# Patient Record
(demographics unavailable — no encounter records)

---

## 2024-11-18 NOTE — ASSESSMENT
[FreeTextEntry1] : ======================================================================================= 1. 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, p/[id number]):   - TTR stabilizer: continue Vyndamx (tafamadis) 61mg po qd   - gene silencer therapy: continue Amvuttra (vutrisiran, siRNA) 25mg sc q 3 months, instructed to take a MVI with sufficient vitamin A   - TTR depleter therapy: not clinically available yet    - MRA: continue spironolactone   - SGLT2i: not indicated at this time    - Loop diuretics: none required    - avoid heart rate slowing agents given poor tolerance in restrictive cardiomyopathy   - avoid ACE / ARB / ARNi given poor tolerance in cardiac amyloidosis   - will follow with serial serum biomarkers (NT-proBNP, creatinine, prealbumin)   - will follow with serial echocardiograms with assessment of LV global longitudinal strain (ordered today)   Monitoring Disease Progression: ---------------------------------------------------- Date.............Prealbumin.................Creatinine..........NT-proBNP..........LV GLS TTE.... 11/18/24......pending.................................... 08/22/24........11.................................1.22.................105....................-18.0% 12/12/23.........5..................................1.05.................184.............................................. 09/11/23................................................................................................-20.4% 09/11/23......dose 2 Amvuttra 06/26/23...... initiated Amvuttra......................................................................................... 04/17/23.........29..........................................................94................................................... 10/25/22.........18........................................................158................................................... 10/22/22........initiated Vyndamax......................................................................................  2. 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 Hypertension: BP not at ACC/AHA 2017 guideline target: off medications again today:  - will avoid heart rate slowing agents given poor tolerance in people with amyloid cardiomyopathy  - change valsartan 320mg po qd to valsartan//25mg po qd   - if BP remains above target next visit will up titrate anti-HTN regimen  4. Dyslipidemia:  (04/17/23):  - continue atorvastatin 20mg po qd  - discussed therapeutic lifestyle changes to promote improved lipid metabolism  - check lab work today  5. Obesity: BMI 34.8: + weight gain (14 pounds since last visit)   - we had an extensive discussion about therapeutic lifestyle changes to promote increased cardiovascular fitness and achieving goal weight  6. Rule out obstructive sleep apnea:  - will order a home sleep study     I spent > 60 minutes of total time on this visit, including time spent face-to-face and non-face-to-face.  During this time, I took a relevant history and examined the patient.  I reviewed relevant portions of the medical record and formulated a differential diagnosis and plan.  I explained the relevant cardiac diagnoses to the patient, as well as the work up and management plan.  I answered all questions related to the patient's cardiac conditions.

## 2024-11-18 NOTE — REASON FOR VISIT
[FreeTextEntry1] : ======================================================================================= Diagnostic Tests: -------------------------------------------------------------- EC24: sinus bradycardia at 59 bpm, otherwise normal ECG.  23: sinus rhythm, normal ECG.  23: sinus rhythm, low voltage QRS limb leads. 10/24/22: sinus rhythm, low voltage QRS limb leads. -------------------------------------------------------------- Nuclear and stress tests: 22: Tc-99m PYP scan: + ATTR, grade II.  -------------------------------------------------------------- Echo: 24: EF 67%, mildly increased LV wall thickness, LV GLS -18.0%, aortic sclerosis, mild AI.  23: EF 63%, mildly increased LV wall thickness, LV GLS -20.4%, mildly increased RV wall thickness, aortic sclerosis, mild MR.  22: EF 60%, mildly increased LV wall thickness, mildly dilated LA, mild MR, mild AI,

## 2024-11-18 NOTE — ADDENDUM
[FreeTextEntry1] : 10/23/23: Her pharmacy benefit manager, Elizabeth, has denied her Vyndamax stating there is no indication for the medication and that use of Vyndamax with Amvuttra is not approvable.  Today, 10/23/23, I spoke with one of their physicians, Aria Luque MD, and clearly explained that Vyndamax is currently the only medication approved in the [place] to treat cardiac amyloidosis and the AMVUTTRA is one of the few indicated to treated amyloid polyneuropathy.  I explained that the indications are distinct, and each is clearly FDA approved therapy for their respective conditions.  To deny Vyndamax would directly negatively impact Ms. Mcgovern's cardiac health and result in increased mortality.  I would refer you to the ATTR-ACT Trial published in the [place] Journal of Medicine (Tafamidis Treatment for Patients with Transthyretin Amyloid Cardiomyopathy, N Engl J Med 2018; 379:5249-1475).  If her AMVUTTRA continues to be denies we would recommend proceeding with Vyndamax alone.

## 2024-11-18 NOTE — HISTORY OF PRESENT ILLNESS
[FreeTextEntry1] : Ms. Mcgovern presents for follow up and management of cardiac amyloidosis (ATTRvt-CM), dyslipidemia, HTN, and pre-DM2.  She is followed by her primary cardiologist, Marie Mir MD.  Her son was the proband case of the ATTRvt-CM and she tested positive during cascade screening of his family members.  She was started on TTR stabilizing therapy with Vyndamax on 10/24/22. Unfortunately, her son was diagnosed at age 39 and already has advanced neurologic manifestations of his disease. She has had 5 members of her family pass away from amyloidosis.  Her amyloid polyneuropathy symptoms have greatly improved on Amvuttra.  On 08/22/24, she had an echocardiogram which revealed an EF of 67%, mildly increased LV wall thickness, LV GLS -18.0%, aortic sclerosis, and mild AI.  She screened positive for possible obstructive sleep apnea, and we agreed to pursue a home sleep study.

## 2024-11-18 NOTE — ADDENDUM
[FreeTextEntry1] : 10/23/23: Her pharmacy benefit manager, Elizabeth, has denied her Vyndamax stating there is no indication for the medication and that use of Vyndamax with Amvuttra is not approvable.  Today, 10/23/23, I spoke with one of their physicians, Aria Luque MD, and clearly explained that Vyndamax is currently the only medication approved in the [place] to treat cardiac amyloidosis and the AMVUTTRA is one of the few indicated to treated amyloid polyneuropathy.  I explained that the indications are distinct, and each is clearly FDA approved therapy for their respective conditions.  To deny Vyndamax would directly negatively impact Ms. Mcgovern's cardiac health and result in increased mortality.  I would refer you to the ATTR-ACT Trial published in the [place] Journal of Medicine (Tafamidis Treatment for Patients with Transthyretin Amyloid Cardiomyopathy, N Engl J Med 2018; 379:6542-8886).  If her AMVUTTRA continues to be denies we would recommend proceeding with Vyndamax alone.

## 2024-11-18 NOTE — ASSESSMENT
[FreeTextEntry1] : ======================================================================================= 1. 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, p/[id number]):   - TTR stabilizer: continue Vyndamx (tafamadis) 61mg po qd   - gene silencer therapy: continue Amvuttra (vutrisiran, siRNA) 25mg sc q 3 months, instructed to take a MVI with sufficient vitamin A   - TTR depleter therapy: not clinically available yet    - MRA: continue spironolactone   - SGLT2i: not indicated at this time    - Loop diuretics: none required    - avoid heart rate slowing agents given poor tolerance in restrictive cardiomyopathy   - avoid ACE / ARB / ARNi given poor tolerance in cardiac amyloidosis   - will follow with serial serum biomarkers (NT-proBNP, creatinine, prealbumin)   - will follow with serial echocardiograms with assessment of LV global longitudinal strain (ordered today)   Monitoring Disease Progression: ---------------------------------------------------- Date.............Prealbumin.................Creatinine..........NT-proBNP..........LV GLS TTE.... 11/18/24......pending.................................... 08/22/24........11.................................1.22.................105....................-18.0% 12/12/23.........5..................................1.05.................184.............................................. 09/11/23................................................................................................-20.4% 09/11/23......dose 2 Amvuttra 06/26/23...... initiated Amvuttra......................................................................................... 04/17/23.........29..........................................................94................................................... 10/25/22.........18........................................................158................................................... 10/22/22........initiated Vyndamax......................................................................................  2. 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 Hypertension: BP not at ACC/AHA 2017 guideline target: off medications again today:  - will avoid heart rate slowing agents given poor tolerance in people with amyloid cardiomyopathy  - change valsartan 320mg po qd to valsartan//25mg po qd   - if BP remains above target next visit will up titrate anti-HTN regimen  4. Dyslipidemia:  (04/17/23):  - continue atorvastatin 20mg po qd  - discussed therapeutic lifestyle changes to promote improved lipid metabolism  - check lab work today  5. Obesity: BMI 34.8: + weight gain (14 pounds since last visit)   - we had an extensive discussion about therapeutic lifestyle changes to promote increased cardiovascular fitness and achieving goal weight  6. Rule out obstructive sleep apnea:  - will order a home sleep study     I spent > 60 minutes of total time on this visit, including time spent face-to-face and non-face-to-face.  During this time, I took a relevant history and examined the patient.  I reviewed relevant portions of the medical record and formulated a differential diagnosis and plan.  I explained the relevant cardiac diagnoses to the patient, as well as the work up and management plan.  I answered all questions related to the patient's cardiac conditions.

## 2024-11-19 NOTE — REASON FOR VISIT
[Other: ____] : [unfilled] [FreeTextEntry1] : ======================================================================================= Diagnostic Tests: -------------------------------------------------------------- EC24: sinus bradycardia at 59 bpm, otherwise normal ECG.  23: sinus rhythm, normal ECG.  23: sinus rhythm, low voltage QRS limb leads. 10/24/22: sinus rhythm, low voltage QRS limb leads. -------------------------------------------------------------- Nuclear and stress tests: 22: Tc-99m PYP scan: + ATTR, grade II.  -------------------------------------------------------------- Echo: 24: EF 67%, mildly increased LV wall thickness, LV GLS -18.0%, aortic sclerosis, mild AI.  23: EF 63%, mildly increased LV wall thickness, LV GLS -20.4%, mildly increased RV wall thickness, aortic sclerosis, mild MR.  22: EF 60%, mildly increased LV wall thickness, mildly dilated LA, mild MR, mild AI,

## 2024-11-19 NOTE — ASSESSMENT
[FreeTextEntry1] : ======================================================================================= 1. 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, p/[id number]):   - TTR stabilizer: continue Vyndamx (tafamadis) 61mg po qd   - gene silencer therapy: continue Amvuttra (vutrisiran, siRNA) 25mg sc q 3 months, instructed to take a MVI with sufficient vitamin A   - TTR depleter therapy: not clinically available yet    - MRA: continue spironolactone   - SGLT2i: not indicated at this time    - Loop diuretics: none required    - avoid heart rate slowing agents given poor tolerance in restrictive cardiomyopathy   - avoid ACE / ARB / ARNi given poor tolerance in cardiac amyloidosis   - will follow with serial serum biomarkers (NT-proBNP, creatinine, prealbumin)   - will follow with serial echocardiograms with assessment of LV global longitudinal strain (ordered today)   Monitoring Disease Progression: ---------------------------------------------------- Date.............Prealbumin.................Creatinine..........NT-proBNP..........LV GLS TTE.... 11/18/24.........5..................................1.32.................112 08/22/24........11.................................1.22.................105....................-18.0% 12/12/23.........5..................................1.05.................184.............................................. 09/11/23................................................................................................-20.4% 09/11/23......dose 2 Amvuttra 06/26/23...... initiated Amvuttra......................................................................................... 04/17/23.........29..........................................................94................................................... 10/25/22.........18........................................................158................................................... 10/22/22........initiated Vyndamax......................................................................................  2. 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 Hypertension: BP not at ACC/AHA 2017 guideline target: off medications again today:  - will avoid heart rate slowing agents given poor tolerance in people with amyloid cardiomyopathy  - change valsartan 320mg po qd to valsartan//25mg po qd   - if BP remains above target next visit will up titrate anti-HTN regimen  4. Dyslipidemia:  (04/17/23):  - continue atorvastatin 20mg po qd  - discussed therapeutic lifestyle changes to promote improved lipid metabolism  - check lab work today  5. Obesity: BMI 34.8: + weight gain (14 pounds since last visit)   - we had an extensive discussion about therapeutic lifestyle changes to promote increased cardiovascular fitness and achieving goal weight  6. Rule out obstructive sleep apnea:  - will order a home sleep study     I spent > 60 minutes of total time on this visit, including time spent face-to-face and non-face-to-face.  During this time, I took a relevant history and examined the patient.  I reviewed relevant portions of the medical record and formulated a differential diagnosis and plan.  I explained the relevant cardiac diagnoses to the patient, as well as the work up and management plan.  I answered all questions related to the patient's cardiac conditions.

## 2024-11-19 NOTE — ADDENDUM
[FreeTextEntry1] : 10/23/23: Her pharmacy benefit manager, Elizabeth, has denied her Vyndamax stating there is no indication for the medication and that use of Vyndamax with Amvuttra is not approvable.  Today, 10/23/23, I spoke with one of their physicians, Aria Luque MD, and clearly explained that Vyndamax is currently the only medication approved in the [place] to treat cardiac amyloidosis and the AMVUTTRA is one of the few indicated to treated amyloid polyneuropathy.  I explained that the indications are distinct, and each is clearly FDA approved therapy for their respective conditions.  To deny Vyndamax would directly negatively impact Ms. Mcgovern's cardiac health and result in increased mortality.  I would refer you to the ATTR-ACT Trial published in the [place] Journal of Medicine (Tafamidis Treatment for Patients with Transthyretin Amyloid Cardiomyopathy, N Engl J Med 2018; 379:8711-5215).  If her AMVUTTRA continues to be denies we would recommend proceeding with Vyndamax alone.

## 2025-02-22 NOTE — HISTORY OF PRESENT ILLNESS
[FreeTextEntry1] : The patient is a 67 year old F with PMHx of cardiac amyloidosis, HLD, HTN, pre-DM, referred by Dr. Mitchell as she screened positive for possible MATT. HST shows mild to moderate MATT. She endorses snoring and choking episodes at night, difficult to sleep on her back without pillows. Endorses frequent nightly awakenings, unrefreshing sleep. Has migraines.  [ESS] : 6

## 2025-02-22 NOTE — ASSESSMENT
[FreeTextEntry1] : Reviewed: HST 1/11/2025: AHI 7.2 (4%); AHI 21.3 (3%); t88 0.2 minutes  The patient is a 67 year old F with PMHx of cardiac amyloidosis, HLD, HTN, pre-DM, referred by Dr. Mitchell as she screened positive for possible MATT. HST shows mild to moderate MATT. The benefits of MATT treatment in improving cardiac, neurologic, cognitive, and mortality outcomes were discussed. PAP, MAD, as well as Zepbound were discussed. Discussed that weight loss can in some instances improve MATT severity. She would like to focus on diet and exercise for weight loss and will reach out to repeat an HST thereafter to assess for possible improvements in MATT severity. Advised to reach out should she wish to pursue pap therapy.   Follow-up as needed.

## 2025-02-22 NOTE — CONSULT LETTER
[Dear  ___] : Dear  [unfilled], [Consult Letter:] : I had the pleasure of evaluating your patient, [unfilled]. [Please see my note below.] : Please see my note below. [Consult Closing:] : Thank you very much for allowing me to participate in the care of this patient.  If you have any questions, please do not hesitate to contact me. [Sincerely,] : Sincerely, [FreeTextEntry3] : Merlene Gonzalez MD  [employer] at [facility] Pulmonary, Critical Care, and Sleep Medicine

## 2025-02-22 NOTE — CONSULT LETTER
[Dear  ___] : Dear  [unfilled], [Consult Letter:] : I had the pleasure of evaluating your patient, [unfilled]. [Please see my note below.] : Please see my note below. [Consult Closing:] : Thank you very much for allowing me to participate in the care of this patient.  If you have any questions, please do not hesitate to contact me. [Sincerely,] : Sincerely, [FreeTextEntry3] : Merlene Gonzalez MD  [employer] at [facility] Pulmonary, Critical Care, and Sleep Medicine

## 2025-02-22 NOTE — PHYSICAL EXAM
[General Appearance - Well Developed] : well developed [General Appearance - Well Nourished] : well nourished [Neck Appearance] : the appearance of the neck was normal [] : no respiratory distress [Exaggerated Use Of Accessory Muscles For Inspiration] : no accessory muscle use [Oriented To Time, Place, And Person] : oriented to person, place, and time [Impaired Insight] : insight and judgment were intact [Abnormal Walk] : normal gait

## 2025-02-22 NOTE — REVIEW OF SYSTEMS
[Snoring] : snoring [Obesity] : obesity [Difficulty Maintaining Sleep] : difficulty maintaining sleep [Negative] : Musculoskeletal [Lower Extremity Discomfort] : no lower extremity discomfort [Unusual Sleep Behavior] : no unusual sleep behavior

## 2025-02-22 NOTE — END OF VISIT
[FreeTextEntry3] :   I, Merlene Gonzalez MD, personally performed the evaluation and management (E/M) services for this patient. That E/M includes conducting the clinically appropriate initial history &/or exam, assessing all conditions, and establishing the plan of care. I have also independently reviewed the medical records and imaging at the time of this office visit, and discussed the above mentioned images with interpretations with the patient. Today, VERONICA Brennan was here to participate in the visit & follow plan of care established by me.

## 2025-03-17 NOTE — REASON FOR VISIT
[FreeTextEntry1] : ======================================================================================= Diagnostic Tests: -------------------------------------------------------------- EC25: sinus rhythm, normal ECG.  24: sinus bradycardia at 59 bpm, otherwise normal ECG.  23: sinus rhythm, normal ECG.  23: sinus rhythm, low voltage QRS limb leads. 10/24/22: sinus rhythm, low voltage QRS limb leads. -------------------------------------------------------------- Nuclear and stress tests: 22: Tc-99m PYP scan: + ATTR, grade II.  -------------------------------------------------------------- Echo: 25: EF 61%, mildly increased LV wall thickness, LV GLS -17.9%, aortic sclerosis, mild AI.  24: EF 67%, mildly increased LV wall thickness, LV GLS -18.0%, aortic sclerosis, mild AI.  23: EF 63%, mildly increased LV wall thickness, LV GLS -20.4%, mildly increased RV wall thickness, aortic sclerosis, mild MR.  22: EF 60%, mildly increased LV wall thickness, mildly dilated LA, mild MR, mild AI. -------------------------------------------------------------- Sleep studies: 25: HST: mild MATT.

## 2025-03-17 NOTE — ADDENDUM
[FreeTextEntry1] : 10/23/23: Her pharmacy benefit manager, Elizabeth, has denied her Vyndamax stating there is no indication for the medication and that use of Vyndamax with Amvuttra is not approvable.  Today, 10/23/23, I spoke with one of their physicians, Aria Luque MD, and clearly explained that Vyndamax is currently the only medication approved in the [place] to treat cardiac amyloidosis and the AMVUTTRA is one of the few indicated to treated amyloid polyneuropathy.  I explained that the indications are distinct, and each is clearly FDA approved therapy for their respective conditions.  To deny Vyndamax would directly negatively impact Ms. Mcgovern's cardiac health and result in increased mortality.  I would refer you to the ATTR-ACT Trial published in the [place] Journal of Medicine (Tafamidis Treatment for Patients with Transthyretin Amyloid Cardiomyopathy, N Engl J Med 2018; 379:0802-6141).  If her AMVUTTRA continues to be denies we would recommend proceeding with Vyndamax alone.

## 2025-03-17 NOTE — ASSESSMENT
[FreeTextEntry1] : ======================================================================================= 1. 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, p/[id number]):   - TTR stabilizer: continue Vyndamx (tafamadis) 61mg po qd   - gene silencer therapy: she will initiate Wainua (eplontersen, ASO) 45mg sc q month, instructed to take a MVI with sufficient vitamin A (Amvuttra not covered by her insurance)    - TTR depleter therapy: not clinically available yet    - MRA: consider adding spironolactone in the future   - SGLT2i: not indicated at this time    - Loop diuretics: none required    - avoid heart rate slowing agents given poor tolerance in restrictive cardiomyopathy   - avoid ACE / ARB / ARNi given poor tolerance in cardiac amyloidosis   - will follow with serial serum biomarkers (NT-proBNP, creatinine, prealbumin) (ordered today)    - will follow with serial echocardiograms with assessment of LV global longitudinal strain  Monitoring Disease Progression: ---------------------------------------------------- Date.............Prealbumin.................Creatinine..........NT-proBNP..........LV GLS TTE.... 03/18/25.....switched Amvuttra to Wainua..................................................................... 03/17/25...............................................................................................-17.9% 11/18/24.........5..................................1.32.................112 08/22/24........11.................................1.22.................105....................-18.0% 12/12/23.........5..................................1.05.................184.............................................. 09/11/23................................................................................................-20.4% 09/11/23......dose 2 Amvuttra 06/26/23...... initiated Amvuttra......................................................................................... 04/17/23.........29..........................................................94................................................... 10/25/22.........18........................................................158................................................... 10/22/22........initiated Vyndamax......................................................................................  2. 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 Hypertension: BP not at ACC/AHA 2017 guideline target: off medications once again today:  - will avoid heart rate slowing agents given poor tolerance in people with amyloid cardiomyopathy  - continue valsartan//25mg po qd   - if BP remains above target next visit will up titrate anti-HTN regimen  4. Dyslipidemia:  (11/18/247):   - continue atorvastatin 20mg po qd  - discussed therapeutic lifestyle changes to promote improved lipid metabolism  - check lab work today  5. Obesity: BMI 34.3: + recent weight loss:   - we had an extensive discussion about therapeutic lifestyle changes to promote increased cardiovascular fitness and achieving goal weight  6. 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  During this time, I took a relevant history and examined the patient.  I reviewed relevant portions of the medical record and formulated a differential diagnosis and plan.  I explained the relevant cardiac diagnoses to the patient, as well as the work up and management plan.  I answered all questions related to the patient's cardiac conditions.

## 2025-03-17 NOTE — HISTORY OF PRESENT ILLNESS
[FreeTextEntry1] : Ms. Mcgovern presents for follow up and management of cardiac amyloidosis (ATTRvt-CM), dyslipidemia, HTN, mild MATT, and pre-DM2.  She is followed by her primary cardiologist, Marie Mir MD.  Her son was the proband case of the ATTRvt-CM and she tested positive during cascade screening of his family members.  She was started on TTR stabilizing therapy with Vyndamax on 10/24/22. Unfortunately, her son was diagnosed at age 39 and already has advanced neurologic manifestations of his disease. She has had 5 members of her family pass away from amyloidosis.  Her amyloid polyneuropathy symptoms have greatly improved on Amvuttra.  On 08/22/24, she had an echocardiogram which revealed an EF of 67%, mildly increased LV wall thickness, LV GLS -18.0%, aortic sclerosis, and mild AI.  She screened positive for possible obstructive sleep apnea, and we agreed to pursue a home sleep study.   Today, 03/17/25, she had an echocardiogram which revealed an EF of 61%, mildly increased LV wall thickness, LV GLS -17.9%, aortic sclerosis, and mild AI.  Her last dose of Amvuttra was in December 2024 and her neuropathy has worsened since that time.  She will start Wainua tomorrow (03/18/25).

## 2025-03-18 NOTE — ADDENDUM
[FreeTextEntry1] : 10/23/23: Her pharmacy benefit manager, Elizabeth, has denied her Vyndamax stating there is no indication for the medication and that use of Vyndamax with Amvuttra is not approvable.  Today, 10/23/23, I spoke with one of their physicians, Aria Luque MD, and clearly explained that Vyndamax is currently the only medication approved in the [place] to treat cardiac amyloidosis and the AMVUTTRA is one of the few indicated to treated amyloid polyneuropathy.  I explained that the indications are distinct, and each is clearly FDA approved therapy for their respective conditions.  To deny Vyndamax would directly negatively impact Ms. Mcgovern's cardiac health and result in increased mortality.  I would refer you to the ATTR-ACT Trial published in the [place] Journal of Medicine (Tafamidis Treatment for Patients with Transthyretin Amyloid Cardiomyopathy, N Engl J Med 2018; 379:6152-8607).  If her AMVUTTRA continues to be denies we would recommend proceeding with Vyndamax alone.

## 2025-03-18 NOTE — REASON FOR VISIT
[Other: ____] : [unfilled] [FreeTextEntry1] : ======================================================================================= Diagnostic Tests: -------------------------------------------------------------- EC25: sinus rhythm, normal ECG.  24: sinus bradycardia at 59 bpm, otherwise normal ECG.  23: sinus rhythm, normal ECG.  23: sinus rhythm, low voltage QRS limb leads. 10/24/22: sinus rhythm, low voltage QRS limb leads. -------------------------------------------------------------- Nuclear and stress tests: 22: Tc-99m PYP scan: + ATTR, grade II.  -------------------------------------------------------------- Echo: 25: EF 61%, mildly increased LV wall thickness, LV GLS -17.9%, aortic sclerosis, mild AI.  24: EF 67%, mildly increased LV wall thickness, LV GLS -18.0%, aortic sclerosis, mild AI.  23: EF 63%, mildly increased LV wall thickness, LV GLS -20.4%, mildly increased RV wall thickness, aortic sclerosis, mild MR.  22: EF 60%, mildly increased LV wall thickness, mildly dilated LA, mild MR, mild AI. -------------------------------------------------------------- Sleep studies: 25: HST: mild MATT.

## 2025-03-18 NOTE — ASSESSMENT
[FreeTextEntry1] : ======================================================================================= 1. 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, p/[id number]):   - TTR stabilizer: continue Vyndamx (tafamadis) 61mg po qd   - gene silencer therapy: she will initiate Wainua (eplontersen, ASO) 45mg sc q month, instructed to take a MVI with sufficient vitamin A (Amvuttra not covered by her insurance)    - TTR depleter therapy: not clinically available yet    - MRA: consider adding spironolactone in the future   - SGLT2i: not indicated at this time    - Loop diuretics: none required    - avoid heart rate slowing agents given poor tolerance in restrictive cardiomyopathy   - avoid ACE / ARB / ARNi given poor tolerance in cardiac amyloidosis   - will follow with serial serum biomarkers (NT-proBNP, creatinine, prealbumin) (ordered today)    - will follow with serial echocardiograms with assessment of LV global longitudinal strain  Monitoring Disease Progression: ---------------------------------------------------- Date.............Prealbumin.................Creatinine..........NT-proBNP..........LV GLS TTE.... 03/18/25.....switched Amvuttra to Wainua..................................................................... 03/17/25.........5..................................1.23.................121.....................-17.9% 11/18/24.........5..................................1.32.................112 08/22/24........11.................................1.22.................105....................-18.0% 12/12/23.........5..................................1.05.................184.............................................. 09/11/23................................................................................................-20.4% 09/11/23......dose 2 Amvuttra 06/26/23...... initiated Amvuttra......................................................................................... 04/17/23.........29..........................................................94................................................... 10/25/22.........18........................................................158................................................... 10/22/22........initiated Vyndamax......................................................................................  2. 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 Hypertension: BP not at ACC/AHA 2017 guideline target: off medications once again today:  - will avoid heart rate slowing agents given poor tolerance in people with amyloid cardiomyopathy  - continue valsartan//25mg po qd   - if BP remains above target next visit will up titrate anti-HTN regimen  4. Dyslipidemia:  (11/18/247):   - continue atorvastatin 20mg po qd  - discussed therapeutic lifestyle changes to promote improved lipid metabolism  - check lab work today  5. Obesity: BMI 34.3: + recent weight loss:   - we had an extensive discussion about therapeutic lifestyle changes to promote increased cardiovascular fitness and achieving goal weight  6. 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  During this time, I took a relevant history and examined the patient.  I reviewed relevant portions of the medical record and formulated a differential diagnosis and plan.  I explained the relevant cardiac diagnoses to the patient, as well as the work up and management plan.  I answered all questions related to the patient's cardiac conditions.

## 2025-03-18 NOTE — HISTORY OF PRESENT ILLNESS
[FreeTextEntry1] : Ms. Mcgovern presents for follow up and management of cardiac amyloidosis (ATTRvt-CM), dyslipidemia, HTN, mild MATT, and pre-DM2.  She is followed by her primary cardiologist, Marie Mir MD.  Her son was the proband case of the ATTRvt-CM and she tested positive during cascade screening of his family members.  She was started on TTR stabilizing therapy with Vyndamax on 10/24/22. Unfortunately, her son was diagnosed at age 39 and already has advanced neurologic manifestations of his disease. She has had 5 members of her family pass away from amyloidosis.  Her amyloid polyneuropathy symptoms have greatly improved on Amvuttra.  On 08/22/24, she had an echocardiogram which revealed an EF of 67%, mildly increased LV wall thickness, LV GLS -18.0%, aortic sclerosis, and mild AI.  She screened positive for possible obstructive sleep apnea, and we agreed to pursue a home sleep study.   Today, 03/17/25, she had an echocardiogram which revealed an EF of 61%, mildly increased LV wall thickness, LV GLS -17.9%, aortic sclerosis, and mild AI.  Her last dose of Amvuttra was in December 2024 and her neuropathy has worsened since that time.  She will start Wainua tomorrow (03/18/25).
myself

## 2025-04-17 NOTE — PHYSICAL EXAM
[FreeTextEntry1] : General: this is a pleasant patient in no acute distress   HEENT conjunctiva are normal, no tenderness in head   CV: normal pulses, regular rate and rhythm, no peripheral edema noted   Lungs: breathing is non-labored   abd: soft and non-distended    Mental status: Alert and oriented to person, place and time, normal speech and comprehension   Cranial Nerves: extra-occular movements in tact without nystagmus, normal saccades and smooth pursuit, Face symmetric and facial strength symmetric, facial sensation symmetric,   Motor: normal bulk and tone throughout. no abnormal movements.  Full 5/5 strength uppers and lower extremities proximally and distally   Sensory: Decreased LT and pinprick in b/l distal lower extremities, mild vibration in decreased at toes b/l R>L   Cerebellar: normal finger-nose-finger bilaterally   Reflexes: 2+ in the upper and HF, KE, KF 5/5 b// extremities and symmetric. Toes flexion 4/5 b/l, Toes extension in RLE 3/5, LLE 5/5,   toes are bilaterally downgoing.   Gait: Antalgic pain due to pain in RLE   Stances: Romberg: normal Sharpened Romberg: normal

## 2025-04-17 NOTE — ASSESSMENT
[FreeTextEntry1] : SURJIT VALENTINE is a 67 year with history of Hereditary cardiac amyloidosis previously following with Dr. Whitaker, here today to establish care. She has worsened On exam, has some sensory deficits in b/l lower extremities RLE>LLE. Also has some weakness in toes flexion and extension worst in the RLE. Findings consistent with worsening of amyloidosis related neuropathy and would strongly recommend reinitiated Amvuttra if symptoms continuously worsen with a third dose of Wainua.   Plan: - C/w Wainua and Vyndamax for now - Consider reinitiating Amvuttra if symptoms persists - Trial of gabapentin 100mg during day and 100-300mg at night - Follow up 3 months

## 2025-04-17 NOTE — HISTORY OF PRESENT ILLNESS
[FreeTextEntry1] : SURJIT VALENTINE is a 67 year with history of Hereditary cardiac amyloidosis previously following with Dr. Whitaker, here today to establish care.  Since last visit with Dr. Whitaker in Nov 2023, patient reports she was doing really well on Vyndamax and Amvuttra until the insurance stopped paying for the amvuttra. Now she is on Vyndamax, Wainua/eplontersen s/p first injection in March 19.  Last shot of amvuttra was in December.  Her current symptoms started coming back in early March - namely: tingling, numbness in hand and toes, with toes feeling worst. Now she also has the heel of her foot which gets easily sore after walking and sometimes she even wakes up with it.   Denies any recent falls, admits to some burning pain in the heel of both feet. Sometimes she has difficulties opening up items, admits to some muscle spasm in her legs especially this week. Her balance is mostly good she reports.  She spoke to her cardiologist who suggested waiting to see how she does on the second shot of eplontersen.   Denies diplopia, blurred vision, dysphagia, dysarthria, aphasia, focal weakness, bowel or bladder dysfunction, imbalance, headaches.

## 2025-06-03 NOTE — ADDENDUM
[FreeTextEntry1] : 10/23/23: Her pharmacy benefit manager, Elizabeth, has denied her Vyndamax stating there is no indication for the medication and that use of Vyndamax with Amvuttra is not approvable.  Today, 10/23/23, I spoke with one of their physicians, Aria Luque MD, and clearly explained that Vyndamax is currently the only medication approved in the [place] to treat cardiac amyloidosis and the AMVUTTRA is one of the few indicated to treated amyloid polyneuropathy.  I explained that the indications are distinct, and each is clearly FDA approved therapy for their respective conditions.  To deny Vyndamax would directly negatively impact Ms. Mcgovern's cardiac health and result in increased mortality.  I would refer you to the ATTR-ACT Trial published in the [place] Journal of Medicine (Tafamidis Treatment for Patients with Transthyretin Amyloid Cardiomyopathy, N Engl J Med 2018; 379:2238-3043).  If her AMVUTTRA continues to be denies we would recommend proceeding with Vyndamax alone.

## 2025-06-03 NOTE — ASSESSMENT
[FreeTextEntry1] : ======================================================================================= 1. 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, [id number]):  - TTR stabilizer: continue Vyndamx (tafamadis) 61mg po qd  - gene silencer therapy: continue Wainua (eplontersen, ASO) 45mg sc q month, instructed to take a MVI with sufficient vitamin A (Amvuttra not covered by her insurance)   - TTR depleter therapy: not clinically available yet   - MRA: consider adding spironolactone in the future  - SGLT2i: not indicated at this time   - Loop diuretics: none required   - avoid heart rate slowing agents given poor tolerance in restrictive cardiomyopathy  - avoid ACE / ARB / ARNi given poor tolerance in cardiac amyloidosis  - will follow with serial serum biomarkers (NT-proBNP, creatinine, prealbumin)   - will follow with serial echocardiograms with assessment of LV global longitudinal strain  Monitoring Disease Progression: ---------------------------------------------------- Date.............Prealbumin.................Creatinine..........NT-proBNP..........LV GLS TTE.... 03/18/25.....switched Amvuttra to Wainua..................................................................... 03/17/25.........5..................................1.23.................121....................-17.9% 11/18/24.........5..................................1.32.................112 08/22/24........11.................................1.22.................105....................-18.0% 12/12/23.........5..................................1.05.................184.............................................. 09/11/23................................................................................................-20.4% 09/11/23......dose 2 Amvuttra 06/26/23.... initiated Amvuttra......................................................................................... 04/17/23.........29..........................................................94................................................... 10/25/22.........18........................................................158................................................... 10/22/22........initiated Vyndamax......................................................................................  2. 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 Hypertension: BP at ACC/AHA 2017 guideline target:   - will avoid heart rate slowing agents given poor tolerance in people with amyloid cardiomyopathy  - continue valsartan//25mg po qd   4. Dyslipidemia:  (03/17/25):   - continue atorvastatin 20mg po qd  - discussed therapeutic lifestyle changes to promote improved lipid metabolism  5. Obesity: BMI 34.3:   - we had an extensive discussion about therapeutic lifestyle changes to promote increased cardiovascular fitness and achieving goal weight  6. MATT, mild (01/11/25):  - we had an extensive discussion about therapeutic lifestyle changes to promote increased cardiovascular fitness and achieve goal weight   7.  Pre-procedural for EGD and colonoscopy with [facility] (fax [phone]):   - history of cardiac amyloidosis (ATTRvt-CM), dyslipidemia, HTN, mild MATT, and pre-DM2.    - scheduled for an intermediate-risk procedure  - the RCRI score (elevated-risk surgery, ischemic heart disease, heart failure, cerebrovascular disease, preoperative treatment with insulin, preoperative creatinine >/= 2) is 1  - Exertional capacity: Duke Activity Status Index (DASI) score: 20.7 points, 5.29 METs  - Frailty Assessment: mildly frail   - denies chest pain  - currently medically optimized and may proceed with the procedure without cardiac contraindications   This encounter was performed as a telehealth encounter employing the [employer], [employer], or other approved audio/video platform.  All components of the evaluation and management were performed per clinical routine with the exception of the physical exam.  The physical exam references my most recent physical exam plus any additional information provided by the patient (i.e. ambulatory vitals/weight) or inspection from the video portion of the encounter.   I spent in excess of 40 minutes on the encounter.    Verbal consent was given on 06/03/25 by Alesia Mcgovern.   Patient location: The patient was located at the primary address listed in the medical record. Physician location: I was located in my office in [facility].

## 2025-07-21 NOTE — ASSESSMENT
[FreeTextEntry1] : ======================================================================================= 1. 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, [id number]):  - TTR stabilizer: continue Vyndamx (tafamadis) 61mg po qd  - gene silencer therapy: continue Wainua (eplontersen, ASO) 45mg sc q month, instructed to take a MVI with sufficient vitamin A (Amvuttra not covered by her insurance)   - TTR depleter therapy: not clinically available yet   - MRA: not currently indicated   - SGLT2i: not indicated at this time   - Loop diuretics: none required   - avoid heart rate slowing agents given poor tolerance in restrictive cardiomyopathy  - avoid ACE / ARB / ARNi given poor tolerance in cardiac amyloidosis  - will follow with serial serum biomarkers (NT-proBNP, creatinine, prealbumin) (ordered today)  - will follow with serial echocardiograms with assessment of LV global longitudinal strain  Monitoring Disease Progression: ---------------------------------------------------- Date.............Prealbumin.................Creatinine..........NT-proBNP..........LV GLS TTE.... 07/21/25........pending  03/18/25.....switched Amvuttra to Wainua..................................................................... 03/17/25.........5..................................1.23................121....................-17.9% 11/18/24.........5..................................1.32.................112 08/22/24........11.................................1.22.................105....................-18.0% 12/12/23.........5..................................1.05.................184.............................................. 09/11/23................................................................................................-20.4% 06/26/23...... initiated Amvuttra......................................................................................... 04/17/23.........29..........................................................94................................................... 10/25/22.........18........................................................158................................................... 10/22/22........initiated Vyndamax......................................................................................  2. 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 Hypertension: BP not at ACC/AHA 2017 guideline target:   - will avoid heart rate slowing agents given poor tolerance in people with amyloid cardiomyopathy  - continue valsartan//25mg po qd  - if BP remains above target next visit will up titrate anti-HTN regimen  4. Dyslipidemia:  (03/17/25):   - continue atorvastatin 20mg po qd  - discussed therapeutic lifestyle changes to promote improved lipid metabolism  5. Obesity: BMI 33.7: + recent weight loss:   - we had an extensive discussion about therapeutic lifestyle changes to promote increased cardiovascular fitness and achieving goal weight  6. 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  This includes time spent preparing to see the patient (e.g. review of relevant history and obtaining / reviewing separately obtained history), performing a medically appropriate exam and evaluation, counselling and educating the patient, ordering medications, tests, or procedures, referring to and communicating with other health care providers where appropriate, documenting clinical information in the health record, independently interpreting relevant results, and care coordination.   I explained the relevant cardiac diagnoses to the patient, as well as the work up and management plan.  I answered all questions related to the patient's cardiac conditions.

## 2025-07-21 NOTE — HISTORY OF PRESENT ILLNESS
[FreeTextEntry1] : Ms. Mcgovern presents for follow up and management of cardiac amyloidosis (ATTRvt-CM), dyslipidemia, HTN, mild MATT, and pre-DM2.  She is followed by her primary cardiologist, Marie Mir MD.  Her son was the proband case of the ATTRvt-CM and she tested positive during cascade screening of his family members.  She was started on TTR stabilizing therapy with Vyndamax on 10/24/22. Unfortunately, her son was diagnosed at age 39 and already has advanced neurologic manifestations of his disease. She has had 5 members of her family pass away from amyloidosis.  Her amyloid polyneuropathy symptoms have greatly improved on Amvuttra.  On 08/22/24, she had an echocardiogram which revealed an EF of 67%, mildly increased LV wall thickness, LV GLS -18.0%, aortic sclerosis, and mild AI.  She screened positive for possible obstructive sleep apnea, and we agreed to pursue a home sleep study.   On 03/17/25, she had an echocardiogram which revealed an EF of 61%, mildly increased LV wall thickness, LV GLS -17.9%, aortic sclerosis, and mild AI.   She initiated Wainua on 03/18/25.  At present, he neuropathy has improved, and she is feeling generqlly well.

## 2025-07-22 NOTE — ASSESSMENT
[FreeTextEntry1] : ======================================================================================= 1. 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, [id number]):  - TTR stabilizer: continue Vyndamx (tafamadis) 61mg po qd  - gene silencer therapy: continue Wainua (eplontersen, ASO) 45mg sc q month, instructed to take a MVI with sufficient vitamin A (Amvuttra not covered by her insurance)   - TTR depleter therapy: not clinically available yet   - MRA: not currently indicated   - SGLT2i: not indicated at this time   - Loop diuretics: none required   - avoid heart rate slowing agents given poor tolerance in restrictive cardiomyopathy  - avoid ACE / ARB / ARNi given poor tolerance in cardiac amyloidosis  - will follow with serial serum biomarkers (NT-proBNP, creatinine, prealbumin) (ordered today)  - will follow with serial echocardiograms with assessment of LV global longitudinal strain  Monitoring Disease Progression: ---------------------------------------------------- Date.............Prealbumin.................Creatinine..........NT-proBNP..........LV GLS TTE.... 07/21/25.......<3.................................1.24.................181............................................... 03/18/25.....[facility]..................................... 03/17/25.........5..................................1.23................121....................-17.9% 11/18/24.........5..................................1.32.................112 08/22/24........11.................................1.22.................105....................-18.0% 12/12/23.........5..................................1.05.................184.............................................. 09/11/23................................................................................................-20.4% 06/26/23...... initiated Amvuttra......................................................................................... 04/17/23.........29..........................................................94................................................... 10/25/22.........18........................................................158................................................... 10/22/22........initiated Vyndamax......................................................................................  2. 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 Hypertension: BP not at ACC/AHA 2017 guideline target:   - will avoid heart rate slowing agents given poor tolerance in people with amyloid cardiomyopathy  - continue valsartan//25mg po qd  - if BP remains above target next visit will up titrate anti-HTN regimen  4. Dyslipidemia:  (03/17/25):   - continue atorvastatin 20mg po qd  - discussed therapeutic lifestyle changes to promote improved lipid metabolism  5. Obesity: BMI 33.7: + recent weight loss:   - we had an extensive discussion about therapeutic lifestyle changes to promote increased cardiovascular fitness and achieving goal weight  6. 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  This includes time spent preparing to see the patient (e.g. review of relevant history and obtaining / reviewing separately obtained history), performing a medically appropriate exam and evaluation, counselling and educating the patient, ordering medications, tests, or procedures, referring to and communicating with other health care providers where appropriate, documenting clinical information in the health record, independently interpreting relevant results, and care coordination.   I explained the relevant cardiac diagnoses to the patient, as well as the work up and management plan.  I answered all questions related to the patient's cardiac conditions.